# Patient Record
Sex: MALE | Race: OTHER | HISPANIC OR LATINO
[De-identification: names, ages, dates, MRNs, and addresses within clinical notes are randomized per-mention and may not be internally consistent; named-entity substitution may affect disease eponyms.]

---

## 2017-03-10 ENCOUNTER — APPOINTMENT (OUTPATIENT)
Dept: VASCULAR SURGERY | Facility: CLINIC | Age: 57
End: 2017-03-10

## 2017-03-10 VITALS — DIASTOLIC BLOOD PRESSURE: 77 MMHG | OXYGEN SATURATION: 98 % | SYSTOLIC BLOOD PRESSURE: 117 MMHG | HEART RATE: 70 BPM

## 2017-05-05 ENCOUNTER — APPOINTMENT (OUTPATIENT)
Dept: VASCULAR SURGERY | Facility: CLINIC | Age: 57
End: 2017-05-05

## 2017-05-05 VITALS — HEART RATE: 65 BPM | SYSTOLIC BLOOD PRESSURE: 115 MMHG | DIASTOLIC BLOOD PRESSURE: 76 MMHG | OXYGEN SATURATION: 96 %

## 2017-09-08 ENCOUNTER — APPOINTMENT (OUTPATIENT)
Dept: VASCULAR SURGERY | Facility: CLINIC | Age: 57
End: 2017-09-08

## 2017-11-17 ENCOUNTER — APPOINTMENT (OUTPATIENT)
Dept: VASCULAR SURGERY | Facility: CLINIC | Age: 57
End: 2017-11-17

## 2018-01-12 ENCOUNTER — APPOINTMENT (OUTPATIENT)
Dept: VASCULAR SURGERY | Facility: CLINIC | Age: 58
End: 2018-01-12
Payer: MEDICAID

## 2018-01-12 PROCEDURE — 99213 OFFICE O/P EST LOW 20 MIN: CPT

## 2018-10-26 ENCOUNTER — APPOINTMENT (OUTPATIENT)
Dept: VASCULAR SURGERY | Facility: CLINIC | Age: 58
End: 2018-10-26
Payer: MEDICAID

## 2018-10-26 PROCEDURE — 99213 OFFICE O/P EST LOW 20 MIN: CPT

## 2018-10-26 PROCEDURE — 93971 EXTREMITY STUDY: CPT

## 2020-02-21 ENCOUNTER — APPOINTMENT (OUTPATIENT)
Dept: VASCULAR SURGERY | Facility: CLINIC | Age: 60
End: 2020-02-21

## 2020-02-28 ENCOUNTER — APPOINTMENT (OUTPATIENT)
Dept: VASCULAR SURGERY | Facility: CLINIC | Age: 60
End: 2020-02-28
Payer: MEDICAID

## 2020-02-28 PROCEDURE — 99213 OFFICE O/P EST LOW 20 MIN: CPT

## 2020-02-28 NOTE — PHYSICAL EXAM
[Normal Breath Sounds] : Normal breath sounds [No Rash or Lesion] : No rash or lesion [Alert] : alert [Oriented to Place] : oriented to place [Oriented to Person] : oriented to person [Oriented to Time] : oriented to time [Calm] : calm [Normal Heart Sounds] : normal heart sounds [Normal Rate and Rhythm] : normal rate and rhythm [JVD] : no jugular venous distention  [de-identified] : Well-appearing, NAD [de-identified] : NCAT [de-identified] : +FROM B/L

## 2020-02-28 NOTE — ASSESSMENT
[FreeTextEntry1] : 60 y/o M with h/o varicosity, multiple stab phlebectomies, s/p B/L GSV RFA presents today for follow-up on BLE pain. Patient is doing well today. Denies any claudication, swelling or rest pain. I informed him that his pain is not vascular in nature and most likely musculoskeletal. I advised him to stretch and massage both his legs. Patient was also recommended to wear compression stockings. No vascular intervention needed at this time. Patient will follow-up here PRN. \par \par

## 2020-02-28 NOTE — HISTORY OF PRESENT ILLNESS
[FreeTextEntry1] : 60 y/o M with h/o varicosity, multiple stab phlebectomies, s/p B/L GSV RFA presents today for follow-up on BLE pain. He was last seen in October of 2018. Patient reports doing well today but c/o pain to both his legs. Denies any claudication, swelling or rest pain.\par \par

## 2020-02-28 NOTE — ADDENDUM
[FreeTextEntry1] : This note was written by Lise Ayala on 02/28/2020  acting as scribe for Dr. Hernandez.

## 2020-02-28 NOTE — REVIEW OF SYSTEMS
[As noted in HPI] : as noted in HPI [Lower Ext Edema] : lower extremity edema [As Noted in HPI] : as noted in HPI [Limb Swelling] : limb swelling [Negative] : Heme/Lymph [Limb Pain] : no limb pain [Leg Claudication] : no intermittent leg claudication

## 2020-08-27 NOTE — HISTORY OF PRESENT ILLNESS
[FreeTextEntry1] : 59 y/o M with h/o varicosity, multiple stab phlebectomies, s/p B/L GSV RFA presents today for follow-up on B/L lower extremity pain. Patient reports doing _______\par \par Denies any claudication, swelling or rest pain.\par \par

## 2020-08-27 NOTE — ASSESSMENT
[FreeTextEntry1] : 61 y/o M with h/o varicosity, multiple stab phlebectomies, s/p B/L GSV RFA presents today for follow-up on B/L lower extremity  pain. Patient is doing ____. Denies any claudication, swelling or rest pain. I have discussed with the patient his pain is not vascular of nature and most likely secondary to  musculoskeletal. Patient advised to stretch and massage both his legs and wear compression stockings.No vascular intervention needed at this time. Patient will follow-up here____

## 2020-08-27 NOTE — ADDENDUM
[FreeTextEntry1] : This note was written by Ellyn Neely on 08/27/2020 acting as scribe for Jose Dang M.D.\par \par I, Jose Dang, have read and attest that all the information, medical decision making and discharge instructions within are true and accurate.

## 2020-08-27 NOTE — PHYSICAL EXAM
[Respiratory Effort] : normal respiratory effort [Normal Rate and Rhythm] : normal rate and rhythm [Abdomen Tenderness] : ~T ~M No abdominal tenderness [Alert] : alert [Oriented to Person] : oriented to person [Oriented to Place] : oriented to place [Oriented to Time] : oriented to time [Calm] : calm [de-identified] : Calm, cooperative [de-identified] : NCAT [de-identified] : Supple

## 2020-08-28 ENCOUNTER — APPOINTMENT (OUTPATIENT)
Dept: VASCULAR SURGERY | Facility: CLINIC | Age: 60
End: 2020-08-28
Payer: MEDICAID

## 2020-09-18 ENCOUNTER — APPOINTMENT (OUTPATIENT)
Dept: VASCULAR SURGERY | Facility: CLINIC | Age: 60
End: 2020-09-18
Payer: MEDICAID

## 2020-09-18 NOTE — ADDENDUM
[FreeTextEntry1] : This note was written by Ellyn Neely on 09/18/2020 acting as scribe for Jose Dang M.D.\par \par I, Jose Dang, have read and attest that all the information, medical decision making and discharge instructions within are true and accurate.

## 2020-09-18 NOTE — ASSESSMENT
[FreeTextEntry1] : 61 y/o M with h/o varicosity, multiple stab phlebectomies, s/p B/L GSV RFA presents today for follow-up on B/L lower extremity  pain.\par Upon examination:\par Discussed with the patient his pain is not vascular of nature and most likely secondary to  musculoskeletal. Patient advised to stretch and massage both his legs and wear compression stockings. No vascular intervention needed at this time. \par Patient will follow-up here

## 2020-09-18 NOTE — PHYSICAL EXAM
[Respiratory Effort] : normal respiratory effort [Normal Rate and Rhythm] : normal rate and rhythm [Alert] : alert [Oriented to Person] : oriented to person [Oriented to Place] : oriented to place [Oriented to Time] : oriented to time [Calm] : calm [Abdomen Tenderness] : ~T ~M No abdominal tenderness [No Rash or Lesion] : No rash or lesion [de-identified] : Calm, cooperative [de-identified] : NCAT [de-identified] : Supple [de-identified] : FROM + B/L LE

## 2020-09-18 NOTE — HISTORY OF PRESENT ILLNESS
[FreeTextEntry1] : 59 y/o M with h/o varicosity, multiple stab phlebectomies, s/p B/L GSV RFA presents today for follow-up on B/L lower extremity pain. Patient reports doing \par \par Denies any claudication, swelling or rest pain.\par \par

## 2020-10-02 ENCOUNTER — APPOINTMENT (OUTPATIENT)
Dept: VASCULAR SURGERY | Facility: CLINIC | Age: 60
End: 2020-10-02
Payer: MEDICAID

## 2020-10-02 PROCEDURE — 99213 OFFICE O/P EST LOW 20 MIN: CPT

## 2020-10-07 NOTE — PHYSICAL EXAM
[Respiratory Effort] : normal respiratory effort [Normal Rate and Rhythm] : normal rate and rhythm [2+] : left 2+ [Ankle Swelling (On Exam)] : present [Varicose Veins Of Lower Extremities] : bilaterally [Ankle Swelling On The Left] : moderate [] : present [Alert] : alert [Oriented to Person] : oriented to person [Oriented to Place] : oriented to place [Oriented to Time] : oriented to time [Calm] : calm [Abdomen Tenderness] : ~T ~M No abdominal tenderness [de-identified] : Calm, cooperative [de-identified] : NCAT [de-identified] : Supple [de-identified] : FROM + B/L LE [de-identified] : Bulging varicose veins over the BLE.

## 2020-10-07 NOTE — HISTORY OF PRESENT ILLNESS
[FreeTextEntry1] : 61 y/o M with h/o varicosity, multiple stab phlebectomies, s/p B/L GSV RFA presents today for follow-up on B/L lower extremity pain. Patient reports feeling pain over the posterior aspect of his BLE and over the plantar aspect of his feet. \par Denies any claudication, swelling or rest pain.\par \par

## 2020-10-07 NOTE — ASSESSMENT
[FreeTextEntry1] : 59 y/o M with h/o varicosity, multiple stab phlebectomies, s/p B/L GSV RFA presents today for follow-up on B/L lower extremity  pain.\par Upon examination:bulging varicose veins over the B/L lower extremities, mild ankle swelling. \par Discussed with the patient his pain is not vascular of nature and most likely secondary to neuropathy and his foot pain likely secondary to plantar fascitis. Not of vascular etiology, no vascular intervention required at this time.  Patient advised to continue stretching, continue to wear compression stockings.\par Patient will follow-up here 6 months.

## 2020-10-07 NOTE — ADDENDUM
[FreeTextEntry1] : This note was written by Ellyn Neely on 10/02/2020 acting as scribe for Jose Dang M.D.\par \par I, Jose Dang, have read and attest that all the information, medical decision making and discharge instructions within are true and accurate.

## 2021-04-02 ENCOUNTER — APPOINTMENT (OUTPATIENT)
Dept: VASCULAR SURGERY | Facility: CLINIC | Age: 61
End: 2021-04-02

## 2021-05-28 ENCOUNTER — APPOINTMENT (OUTPATIENT)
Dept: VASCULAR SURGERY | Facility: CLINIC | Age: 61
End: 2021-05-28
Payer: SELF-PAY

## 2021-05-28 NOTE — PHYSICAL EXAM
[Respiratory Effort] : normal respiratory effort [Normal Rate and Rhythm] : normal rate and rhythm [2+] : left 2+ [Ankle Swelling (On Exam)] : present [Varicose Veins Of Lower Extremities] : bilaterally [Ankle Swelling On The Left] : moderate [] : present [Abdomen Tenderness] : ~T ~M No abdominal tenderness [Alert] : alert [Oriented to Person] : oriented to person [Oriented to Place] : oriented to place [Oriented to Time] : oriented to time [Calm] : calm [de-identified] : Calm, cooperative [de-identified] : NCAT [de-identified] : Supple [de-identified] : FROM + B/L LE [de-identified] : Bulging varicose veins over the BLE.

## 2021-05-28 NOTE — ASSESSMENT
[FreeTextEntry1] : 61 y/o M with h/o varicosity, multiple stab phlebectomies, s/p B/L GSV RFA presents today for follow-up on B/L lower extremity  pain.On exam, bulging varicose veins over the B/L lower extremities, mild ankle swelling. \par \par \par Plan: \par

## 2021-06-04 ENCOUNTER — APPOINTMENT (OUTPATIENT)
Dept: VASCULAR SURGERY | Facility: CLINIC | Age: 61
End: 2021-06-04
Payer: SELF-PAY

## 2021-06-04 PROCEDURE — 99212 OFFICE O/P EST SF 10 MIN: CPT

## 2021-06-08 NOTE — ASSESSMENT
[Arterial/Venous Disease] : arterial/venous disease [Medication Management] : medication management [FreeTextEntry1] : 61 y/o M with h/o varicosity, multiple stab phlebectomies, s/p B/L GSV RFA presents today for follow-up on B/L lower extremity  pain.On exam, LLE: mild swelling from below the knees to the feet particularly around the ankles.Bulging varicose veins over the BLEs. \par \par \par Plan: \par Discussed findings and treatment options. Patient reassured symptoms are not vacular in origin and likely to arthrotic changes and fluid retention. Pt with long hx of recurrent bulging varicose veins not bothersome at the moment. He is recommended to continue to stay active through daily exercising and stretching. No vascular surgery interventions recommended at this time. He may continue to f/u here as needed. \par

## 2021-06-08 NOTE — PHYSICAL EXAM
[Respiratory Effort] : normal respiratory effort [Normal Rate and Rhythm] : normal rate and rhythm [2+] : left 2+ [Ankle Swelling (On Exam)] : present [Varicose Veins Of Lower Extremities] : bilaterally [Ankle Swelling On The Left] : moderate [] : present [Alert] : alert [Oriented to Person] : oriented to person [Oriented to Place] : oriented to place [Oriented to Time] : oriented to time [Calm] : calm [Abdomen Tenderness] : ~T ~M No abdominal tenderness [de-identified] : Calm, cooperative [de-identified] : NCAT [de-identified] : Supple [de-identified] : FROM + B/L LE [de-identified] : LLE: mild swelling from below the knees to the feet particularly around the ankles.Bulging varicose veins over the BLEs.

## 2021-06-08 NOTE — ADDENDUM
[FreeTextEntry1] : This note was written by Ellyn Neely on 06/04/2021 acting as scribe for Jose Harrison M.D.\par \par I, Dr. Jose Hernandez, personally performed the evaluation and management (E/M) services for this established patient who presents today with (an) existing condition(s).  That E/M includes conducting the examination, assessing all conditions, and (re)establishing/reinforcing a plan of care.  Today, my ACP, Nika ROBERTSON, was here to observe my evaluation and management services for this condition to be followed going forward.\par \par \par \par

## 2021-06-08 NOTE — HISTORY OF PRESENT ILLNESS
[FreeTextEntry1] : 61 y/o M with h/o varicosity, multiple stab phlebectomies, s/p B/L GSV RFA presents today for follow-up on B/L lower extremity pain. Patient reports feeling LE heaviness and pain particularly at night after activities. He is concern this may be related to his circulation. Furthermore, patient notes mild LE swelling over the last couple of weeks. He has been actively exercising daily. Denies any rest pain, claudication, open sores/ulcers. \par \par

## 2023-03-28 NOTE — ADDENDUM
[FreeTextEntry1] : This note was written by Ellyn Neely on 05/28/2021 acting as scribe for Jose Harrison M.D.\par \par I, Jose Dang have read and attest that all the information, medical decision making and discharge instructions within are true and accurate.  go home